# Patient Record
Sex: MALE | Race: WHITE
[De-identification: names, ages, dates, MRNs, and addresses within clinical notes are randomized per-mention and may not be internally consistent; named-entity substitution may affect disease eponyms.]

---

## 2019-08-21 ENCOUNTER — HOSPITAL ENCOUNTER (EMERGENCY)
Dept: HOSPITAL 4 - SED | Age: 29
Discharge: HOME | End: 2019-08-21
Payer: COMMERCIAL

## 2019-08-21 VITALS — WEIGHT: 198 LBS | SYSTOLIC BLOOD PRESSURE: 121 MMHG | HEIGHT: 73 IN | BODY MASS INDEX: 26.24 KG/M2

## 2019-08-21 VITALS — SYSTOLIC BLOOD PRESSURE: 121 MMHG

## 2019-08-21 DIAGNOSIS — S01.81XA: Primary | ICD-10-CM

## 2019-08-21 DIAGNOSIS — R03.0: ICD-10-CM

## 2019-08-21 NOTE — NUR
Patient has a approx. 3 cm laceration to left lateral periorbital.  FORREST Chowdhury NP 
applied sutures using sterile technique.  Edges well approximated.  Site 
cleansed with normal saline per Noel NP ].  bacitracin of  applied to site 
with 6 sutures well approximated.  No bleeding noted.  Pt tolerated well.

## 2019-08-21 NOTE — NUR
-------------------------------------------------------------------------------

            *** Note undone in EDM - 08/21/19 at 1925 by SDEDTD ***            

-------------------------------------------------------------------------------

Patient given written and verbal discharge instructions and verbalizes 
understanding.  ER MD discussed with patient the results and treatment 
provided. Patient in stable condition. ID arm band removed. 

Rx of bacitracin & motrin given. Patient educated on pain management and to 
follow up with PMD. Pain Scale 4/10 tolerable for pt.

Opportunity for questions provided and answered. Medication side effect fact 
sheet provided.

## 2019-08-21 NOTE — NUR
Patient given written and verbal discharge instructions and verbalizes 
understanding.  ER MD discussed with patient the results and treatment 
provided. Patient in stable condition. ID arm band removed. 

Rx of bacitracin & motrin given. Patient educated on pain management and to 
follow up with PMD. Pain Scale 4/10 tolerable for pt.

Opportunity for questions provided and answered. Medication side effect fact 
sheet provided.

## 2019-08-21 NOTE — NUR
Patient presented to ER with facial laceration. Patient A&Ox4, ambulatory to 
ER, afebrile, laceration approx. 3 cm to left lateral periorbital area. Patient 
states injury occurred at gym today. Patient states he was plaing basketball 
when another player hit pt in the face causing laceration to face. patient 
states pain 6/10, denies N/V/D. deies other health hx

## 2020-02-17 ENCOUNTER — HOSPITAL ENCOUNTER (EMERGENCY)
Dept: HOSPITAL 4 - SED | Age: 30
Discharge: LEFT BEFORE DISCHARGE | End: 2020-02-17
Payer: MEDICAID

## 2020-02-17 DIAGNOSIS — Z53.21: ICD-10-CM

## 2020-02-17 DIAGNOSIS — R06.02: ICD-10-CM

## 2020-02-17 DIAGNOSIS — R50.9: Primary | ICD-10-CM
